# Patient Record
Sex: MALE | Race: WHITE
[De-identification: names, ages, dates, MRNs, and addresses within clinical notes are randomized per-mention and may not be internally consistent; named-entity substitution may affect disease eponyms.]

---

## 2017-09-29 ENCOUNTER — HOSPITAL ENCOUNTER (INPATIENT)
Dept: HOSPITAL 80 - CED | Age: 42
LOS: 2 days | Discharge: HOME | DRG: 563 | End: 2017-10-01
Attending: SURGERY | Admitting: SURGERY
Payer: MEDICAID

## 2017-09-29 DIAGNOSIS — Z23: ICD-10-CM

## 2017-09-29 DIAGNOSIS — S42.215A: ICD-10-CM

## 2017-09-29 DIAGNOSIS — S43.005A: ICD-10-CM
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
## 2017-09-29 DIAGNOSIS — R40.2412: ICD-10-CM

## 2017-09-29 DIAGNOSIS — S52.501A: Primary | ICD-10-CM

## 2017-09-29 DIAGNOSIS — S01.81XA: ICD-10-CM

## 2017-09-29 DIAGNOSIS — S62.101A: ICD-10-CM

## 2017-09-29 DIAGNOSIS — S52.611A: ICD-10-CM

## 2017-09-29 LAB
% IMMATURE GRANULYOCYTES: 0.6 % (ref 0–1.1)
% IMMATURE GRANULYOCYTES: 0.7 % (ref 0–1.1)
ABSOLUTE IMMATURE GRANULOCYTES: 0.05 10^3/UL (ref 0–0.1)
ABSOLUTE IMMATURE GRANULOCYTES: 0.1 10^3/UL (ref 0–0.1)
ABSOLUTE NRBC COUNT: 0 10^3/UL (ref 0–0.01)
ABSOLUTE NRBC COUNT: 0 10^3/UL (ref 0–0.01)
ADD DIFF?: NO
ADD DIFF?: NO
ADD MORPH?: NO
ADD MORPH?: NO
ADD SCAN?: NO
ADD SCAN?: NO
ANION GAP SERPL CALC-SCNC: 13 MEQ/L (ref 8–16)
APTT BLD: 24.3 SEC (ref 23–38)
ATYPICAL LYMPHOCYTE FLAG: 0 (ref 0–99)
ATYPICAL LYMPHOCYTE FLAG: 10 (ref 0–99)
CALCIUM SERPL-MCNC: 9.1 MG/DL (ref 8.5–10.4)
CHLORIDE SERPL-SCNC: 104 MEQ/L (ref 97–110)
CO2 SERPL-SCNC: 24 MEQ/L (ref 22–31)
CREAT SERPL-MCNC: 1.2 MG/DL (ref 0.7–1.3)
ERYTHROCYTE [DISTWIDTH] IN BLOOD BY AUTOMATED COUNT: 12.1 % (ref 11.5–15.2)
ERYTHROCYTE [DISTWIDTH] IN BLOOD BY AUTOMATED COUNT: 12.2 % (ref 11.5–15.2)
FRAGMENT RBC FLAG: 0 (ref 0–99)
FRAGMENT RBC FLAG: 0 (ref 0–99)
GFR SERPL CREATININE-BSD FRML MDRD: > 60 ML/MIN/{1.73_M2}
GLUCOSE SERPL-MCNC: 139 MG/DL (ref 70–100)
HCT VFR BLD CALC: 35.8 % (ref 40–51)
HCT VFR BLD CALC: 41.4 % (ref 40–51)
HGB BLD-MCNC: 12 G/DL (ref 13.7–17.5)
HGB BLD-MCNC: 14.4 G/DL (ref 13.7–17.5)
INR PPP: 1.01 (ref 0.83–1.16)
LEFT SHIFT FLG: 0 (ref 0–99)
LEFT SHIFT FLG: 0 (ref 0–99)
LIPEMIA HEMOLYSIS FLAG: 80 (ref 0–99)
LIPEMIA HEMOLYSIS FLAG: 90 (ref 0–99)
MCH RBC BLDCO QN: 31 PG (ref 27.9–34.1)
MCH RBC BLDCO QN: 31.5 PG (ref 27.9–34.1)
MCHC RBC AUTO-ENTMCNC: 33.5 G/DL (ref 32.4–36.7)
MCHC RBC AUTO-ENTMCNC: 34.8 G/DL (ref 32.4–36.7)
MCV RBC AUTO: 90.6 FL (ref 81.5–99.8)
MCV RBC AUTO: 92.5 FL (ref 81.5–99.8)
NRBC-AUTO%: 0 % (ref 0–0.2)
NRBC-AUTO%: 0 % (ref 0–0.2)
PLATELET # BLD: 174 10^3/UL (ref 150–400)
PLATELET # BLD: 214 10^3/UL (ref 150–400)
PLATELET CLUMPS FLAG: 0 (ref 0–99)
PLATELET CLUMPS FLAG: 10 (ref 0–99)
PMV BLD AUTO: 10.1 FL (ref 8.7–11.7)
PMV BLD AUTO: 9.8 FL (ref 8.7–11.7)
POTASSIUM SERPL-SCNC: 3.8 MEQ/L (ref 3.5–5.2)
PROTHROMBIN TIME: 13 SEC (ref 12–15)
RBC # BLD AUTO: 3.87 10^6/UL (ref 4.4–6.38)
RBC # BLD AUTO: 4.57 10^6/UL (ref 4.4–6.38)
SODIUM SERPL-SCNC: 141 MEQ/L (ref 134–144)

## 2017-09-29 PROCEDURE — G0378 HOSPITAL OBSERVATION PER HR: HCPCS

## 2017-09-29 PROCEDURE — L0174 CERV SR 2PC THOR EXT PRE OTS: HCPCS

## 2017-09-29 PROCEDURE — G0008 ADMIN INFLUENZA VIRUS VAC: HCPCS

## 2017-09-29 PROCEDURE — A4565 SLINGS: HCPCS

## 2017-09-29 NOTE — EDPHY
H & P


HPI/ROS: 





CHIEF COMPLAINT:  Multi trauma





HISTORY OF PRESENT ILLNESS:  This is a 42-year-old male who was initially 

evaluated the [facility] after she walked in following a 

significant traumatic event.  Patient fell 10-15 feet off of a roof landing on 

his head.  Evaluation at the [facility] included CT scans of his 

head, cervical spine, chest, abdomen pelvis all of which demonstrated no acute 

traumatic findings.  Patient had a left shoulder dislocation which was reduced 

with sedation.  Patient also had a right wrist fracture which was reduced and 

splinted, and done under sedation.  Patient has significant scalp laceration on 

the left with a large hematoma.  This was repaired prior to transfer.


Patient's course has been discussed by Dr. Nabil Bedoya with Dr. Long Jade.


Patient presents to the emergency department for further evaluation and 

admission to the hospital.


On arrival to the emergency department the patient reports that he is feeling 

much improved.  He does still continue to have some discomfort in his right 

wrist.





REVIEW OF SYSTEMS:


Aside from elements discussed in the HPI, a comprehensive 10-point review of 

systems was reviewed and is negative.





PAST MEDICAL HISTORY:  Patient denies.  Status post appendectomy





SOCIAL HISTORY:  Nonsmoker.  Works as a .





VITAL SIGNS:  see nurse's notes.


GENERAL:  Well-developed, well-nourished, reporting pain in his wrist.


HEENT:  Bandages around the head.  Pupils are equal round reactive to light.  

Extraocular movements are intact.  No oral trauma noted. Neck:  supple, FROM.  

Non tenderness palpation.


LUNGS:  Clear to auscultation.


CARDIAC:  Regular rate and rhythm.


ABDOMEN:  Soft nontender nondistended.


BACK:  No CVA tenderness.  No vertebral tenderness.


EXTREMITIES:  Left shoulder is in a sling.  Patient has abrasions over the 

deltoid.  Right upper extremity is in a splint and sling.


NEURO:  Alert and oriented, grossly nonfocal.


SKIN:  [Warm and dry, no rash.





Constitutional: 


 Initial Vital Signs











Temperature (C)  36.9 C   09/29/17 16:42


 


Heart Rate  82   09/29/17 16:42


 


Respiratory Rate  28 H  09/29/17 16:42


 


Blood Pressure  140/112 H  09/29/17 16:42


 


O2 Sat (%)  94   09/29/17 16:42








 











O2 Delivery Mode [Post         Room Air





Procedure 4th]                 


 


O2 Delivery Mode [Post         Non-Rebreather Mask





Procedure 3rd]                 


 


O2 Delivery Mode [Post         Non-Rebreather Mask





Procedure 2nd]                 


 


O2 Delivery Mode [Post         Non-Rebreather Mask





Procedure 1st]                 


 


O2 Delivery Mode [Procedural   Non-Rebreather Mask





1st]                           


 


O2 Delivery Mode [Procedural   Non-Rebreather Mask





2nd]                           


 


O2 Delivery Mode [.Immediate   Non-Rebreather Mask





Pre-Procedure Procedural 1st]  


 


O2 Delivery Mode               Room Air


 


O2 (L/minute) [Post Procedure  15





3rd]                           


 


O2 (L/minute) [Post Procedure  15





2nd]                           


 


O2 (L/minute) [Post Procedure  15





1st]                           


 


O2 (L/minute) [Procedural 1st] 15


 


O2 (L/minute) [Procedural 2nd] 15


 


O2 (L/minute) [.Immediate Pre- 15





Procedure Procedural 1st]      


 


O2 (L/minute)                  15














Allergies/Adverse Reactions: 


 





No Known Allergies Allergy (Verified 09/29/17 20:29)


 








Home Medications: 














 Medication  Instructions  Recorded


 


Ibuprofen [Motrin (*)] 600 mg PO Q6H PRN 09/29/17


 


Multivitamins [Multivitamin (*)] 1 each PO DAILY 09/29/17














MDM/Departure





- [facility]


Imaging Results: 


 Imaging Impressions





Abdomen CT  09/29/17 16:46


Impression:


1. 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


2. No evidence of laceration of the liver, spleen, pancreas, or kidneys.


3. No retroperitoneal hematoma.


4. No lumbar compression fractures or pelvic bone fractures.


 


Findings and recommendations discussed with Emergency Department physician, 

Nabil Bedoya MD at 17:35 hour, 9/29/2017.


 


Final report concurs with initial preliminary interpretation.








Cervical Spine CT  09/29/17 16:46


Impression:


1. Posterior dislocation of the left shoulder with Hill-Sachs fracture 

deformity of the left humeral head.


2. No definite cervical spine fracture or spondylolisthesis.


3. If there is persistent pain or neurological deficit, recommend MR cervical 

spine and consider flexion and extension views, if clinically indicated.


 


Findings and recommendations discussed with Emergency Department physician, 

Nabil Bedoya MD, at 17:35 hour, 9/29/2017.


 


Final report concurs with initial preliminary interpretation.








Chest CT  09/29/17 16:46


Impression:


1. Posterior dislocation of the left shoulder with Hill-Sachs fracture 

deformity.


2. No pneumothorax, mediastinal hematoma, or pleural effusion.


3. No aortic rupture or dissection.


 


Findings and recommendations discussed with Emergency Department physician, 

Nabil Bedoya MD, at 17:35 hour, 9/29/2017.


 


Final report concurs with initial preliminary interpretation.








Chest X-Ray  09/29/17 16:46


Impression:


1. No acute pulmonary disease.


2. No pneumothorax.


3. Left humeral head Hill-Sachs fracture deformity better identified on the CT. 

Consider dedicated left shoulder x-rays.








Head CT  09/29/17 16:46


Impression:


1. No intracranial hemorrhage or mass effect.


2. Left frontal scalp hematoma.


3. No skull fracture.


 


Findings and recommendations discussed with Emergency Department physician, 

Nabil Bedoya MD, at 17:35 hour, 9/29/2017.


 


Final report concurs with initial preliminary interpretation.








Knee X-Ray  09/29/17 16:47


Impression:


1. Normal bilateral knee series. No fracture.








Shoulder X-Ray  09/29/17 16:47


Impression:


1. Hairline fracture suspected proximal left humeral shaft that probably 

extends into the humeral head region.


2. Clinical correlation recommended to rule out possible posterior dislocation. 

Consider Y view or axillary view as clinically directed.








Wrist X-Ray  09/29/17 16:47


Impression:  


1. Acute comminuted displaced and angulated intra-articular distal radius 

fracture.


2. Acute displaced ulna styloid fracture.








Knee X-Ray  09/29/17 16:54


Impression:


1. Normal bilateral knee series. No fracture.








Shoulder X-Ray  09/29/17 18:21


Impression: 


1. Good position left humeral head postreduction.


2. A hairline fracture previously suspected proximal left humeral head to neck 

is not as well delineated on these images. Consider follow-up.








Wrist X-Ray  09/29/17 18:22


Impression: Good alignment of complex distal right radial fracture as well as 

fracture of the ulnar styloid.











Medications Given: 


 








Discontinued Medications





Diphtheria/Tetanus/Acell Pertussis (Boostrix)  0.5 ml IM .ONCE ONE


   Stop: 09/29/17 16:49


   Last Admin: 09/29/17 17:57 Dose:  0.5 ml


Fentanyl (Sublimaze)  50 mcg IVP EDNOW ONE


   Stop: 09/29/17 16:49


   Last Admin: 09/29/17 17:02 Dose:  50 mcg


Fentanyl (Sublimaze)  50 mcg IVP EDNOW ONE


   Stop: 09/29/17 17:52


   Last Admin: 09/29/17 17:53 Dose:  50 mcg


Fentanyl (Sublimaze)  50 mcg IVP EDNOW ONE


   Stop: 09/29/17 19:04


   Last Admin: 09/29/17 19:05 Dose:  50 mcg


Sodium Chloride (Ns)  1,000 mls @ 0 mls/hr IV ONCE ONE


   PRN Reason: Wide Open


   Stop: 09/29/17 16:49


   Last Admin: 09/29/17 16:50 Dose:  1,000 mls


Cefazolin Sodium 2 gm/ Sodium (Chloride)  100 mls @ 200 mls/hr IV EDNOW ONE


   PRN Reason: Protocol


   Stop: 09/29/17 19:32


   Last Admin: 09/29/17 19:15 Dose:  Not Given


Sodium Chloride (Ns)  1,000 mls @ 0 mls/hr IV ONCE ONE


   PRN Reason: Wide Open


   Stop: 09/29/17 17:56


   Last Admin: 09/29/17 17:55 Dose:  1,000 mls


Cefazolin Sodium/Dextrose (Ancef 1 Gm (Premix))  50 mls @ 200 mls/hr IV EDNOW 

ONE


   PRN Reason: Protocol


   Stop: 09/29/17 20:07


   Last Admin: 09/29/17 20:08 Dose:  50 mls


Ondansetron HCl (Zofran)  4 mg IVP EDNOW ONE


   Stop: 09/29/17 16:49


   Last Admin: 09/29/17 17:04 Dose:  4 mg


Propofol (Diprivan)  100 mg IVP EDNOW ONE


   Stop: 09/29/17 18:18


   Last Admin: 09/29/17 18:17 Dose:  100 mg





ED Course/Re-evaluation: 





Dr. Jade was contacted on the patient's arrival to the emergency 

department.  Patient has had Ancef ordered but had not been given prior to his 

transfer.  1 g of Ancef was infused.  Patient will be admitted to the surgical 

service for multi trauma.  Dr. Wilson has previously been contact with 

respect to the patient's orthopedic injuries.


Differential Diagnosis: 





Differential diagnosis of this patient's traumatic event was considered 

including but not limited to intracranial injury, long bone and pelvic bone 

fracture, spinal injury, intrathoracic injury, extremity injury, intra-

abdominal injury, lacerations, abrasions, and contusions.





- Depart


Disposition: [facility] Acute


Clinical Impression: 


 Soft tissue injury





Traumatic hematoma of forehead


Qualifiers:


 Encounter type: initial encounter Qualified Code(s): S00.83XA - Contusion of 

other part of head, initial encounter





Forehead laceration


Qualifiers:


 Encounter type: initial encounter Qualified Code(s): S01.81XA - Laceration 

without foreign body of other part of head, initial encounter





Wrist fracture


Qualifiers:


 Encounter type: initial encounter Fracture type: closed Laterality: right 

Qualified Code(s): S62.101A - Fracture of unspecified carpal bone, right wrist, 

initial encounter for closed fracture





Shoulder dislocation


Qualifiers:


 Encounter type: initial encounter Laterality: left Qualified Code(s): S43.005A 

- Unspecified dislocation of left shoulder joint, initial encounter





Humeral surgical neck fracture


Qualifiers:


 Encounter type: initial encounter Fracture type: closed Fracture morphology: 

unspecified fracture morphology Fracture alignment: nondisplaced Laterality: 

left Qualified Code(s): S42.215A - Unspecified nondisplaced fracture of 

surgical neck of left humerus, initial encounter for closed fracture





Fall


Qualifiers:


 Encounter type: initial encounter Qualified Code(s): W19.XXXA - Unspecified 

fall, initial encounter





Condition: Serious

## 2017-09-29 NOTE — EDPHY
H & P


HPI/ROS: 





HPI





CHIEF COMPLAINT:  Fell 10-12 feet off a roof.  Head injury.  Head laceration.  

Right wrist pain.  Left shoulder pain.  Bilateral knee pain.  Positive LOC.





HISTORY OF PRESENT ILLNESS:  This patient is a 42-year-old male, otherwise 

healthy no significant medical history does not take any daily medications not 

on any anticoagulation.  He states that he fell off a roof 10-12 feet.  He 

struck his head on concrete.  Positive LOC.  He presents by private vehicle 

walk into the emergency room.  He is GCS 15 upon arrival.  Hemodynamically 

stable.  His main complaint is left shoulder pain, right wrist pain, headache.  

He denies any neck pain chest pain or shortness of breath.  Denies abdominal 

pain.  Additionally tells me he has bilateral knee pain.  His pain is 7/10.  

Worse on his left shoulder.


He has an obvious deformity on his right wrist.





Upon arrival to the emergency room this patient head to toe trauma exam.  He 

was immediately placed in a cervical collar.  His clothes were all removed.  

His head to toe trauma exam shows an obvious right wrist deformity.  Not open.  

His right hand is neurovascularly intact.  Additionally he has a deformity to 

his left shoulder.  His left arm is neurovascular intact good distal pulse.  

Good distal pulses both arms.  He has obvious head laceration to left frontal 

temporal region.  No midline cervical spine pain or step-offs.  No back pain on 

exam.  Moves all his extremities but complains of bilateral knee pain.





 


Past Medical History:  No medical history





Past Surgical History:  No surgical history





Social History:  Denies daily use drugs alcohol tobacco products.  Works as a 

.  He was painting a wall when he fell.





Family History:  Noncontributory.








ROS   


REVIEW OF SYSTEMS:


A comprehensive 10 point review of systems is otherwise negative aside from 

elements mentioned in the history of present illness.








Exam   


Constitutional GCS 15, alert and orient x4,   triage nursing summary reviewed, 

vital signs reviewed, awake/alert. 


Eyes   normal conjunctivae and sclera, EOMI, PERRLA. 


HENT head/neck:  Obvious scalp hematoma to left side of the head, with 

laceration present, neck no midline cervical spine pain or step-offs, no 

crepitus.  Midface stable.  No evidence of basilar skull fracture.  TMs are 

clear.  moist mucus membranes, no epistaxis, neck supple/ no meningismus, no 

raccoon eyes. 


Respiratory   clear to auscultation bilaterally, normal breath sounds, no 

respiratory distress, no wheezing. 


Cardiovascular no chest wall crepitus.  No chest wall deformity.  rate normal, 

regular rhythm, no murmur, no edema, distal pulses normal. 


Gastrointestinal   soft, non-tender, no rebound, no guarding, normal bowel 

sounds, no distension, no pulsatile mass. 


Genitourinary   no CVA tenderness. 


Musculoskeletal left upper extremity:  Good distal pulse.  Good radial pulse.  

Good cap refill.  Obvious deformity and swelling to left shoulder.  

Additionally right arm good distal pulse good cap refill.  Warm extremity 

obvious deformity to the right wrist.  Closed.  Bilateral knee tenderness to 

palpation anteriorly.  However full range of motion no significant swelling.  

No laceration present.  no midline vertebral tenderness, full range of motion, 

no calf swelling, no tenderness of extremities, no meningismus, neurovascularly 

intact.


Skin   pink, warm, & dry, no rash, laceration present to the left frontal 

temporal region.


Neurologic   awake, alert and oriented x 3, AAOx3, moves all 4 extremities 

equally, motor intact, sensory intact, CN II-XII intact, normal cerebellar, 

normal vision, normal speech. 


Psychiatric   normal mood/affect. 


Heme/Lymph/Immune   no lymphadenopathy.





Differential Diagnosis:  Includes but is not limited to in a particular order 

closed-head injury, concussion, scalp hematoma, scalp laceration, intracranial 

bleed, traumatic subarachnoid, subdural, epidural, skull fracture, cervical 

spine injury, multiple orthopedic injuries including right wrist fracture, left 

shoulder fracture, rib fractures, chest wall injury, intrathoracic chest 

traumatic injury, intra-abdominal traumatic injury, solid organ injury, 

extremity injury.





Medical Decision Making:  Plan for this patient 2 large-bore IVs full cardiac 

monitor, blood draw, IV fluid bolus 1 L normal saline, IV fentanyl for pain 

control.  IV Zofran for nausea.  Proceed directly to CT scan head neck chest 

abdomen pelvis trauma protocol.  And then secondary survey with x-rays of the 

right wrist, left shoulder, bilateral knees.





Re-evaluation:








CT scan of the head without contrast and cervical spine without contrast for 

trauma.  The results of the study are shows a significant left scalp hematoma, 

otherwise no intracranial bleed or skull fracture, no cervical spine fracture].

  The study was read by Dr. Maloney  I viewed the images myself on the PACS 

system.


CT scan of the chest abdomen pelvis with IV contrast for trauma .  The results 

of the study are no acute traumatic injury.  It does recognize a left shoulder 

posterior dislocation with Hill-Sachs deformity..  The study was read by Dr. Maloney I viewed the images myself on the PACS system.





ED x-ray:  Chest one view:  This shows no acute cardiopulmonary disease however 

does recognize the left posterior shoulder dislocation.





ED x-ray left shoulder:  This shows a posterior shoulder dislocation with Hill-

Sachs deformity.  As well as a hairline fracture through the neck head region





ED x-ray right wrist:  This shows a comminuted distal right wrist radius 

fracture.





Critical Care:  Total Critical Care Time Spent Managing this Patient: 65 

Minutes.


This time was spent Exclusively with this patient.


This Care was exclusive of procedures.


The Organ System/life at risk was poly trauma


 This Patient was in Critical Condition because multiple orthopedic fractures.  

Additionally large scalp hematoma additionally large scalp laceration.





1840:


Procedure:  Procedural sedation.


Indication:  Closed reduction of left shoulder posterior dislocation 

additionally indication comminuted distal right radius fracture requiring 

reduction.  Additional procedure laceration repair under conscious sedation.


A pre-sedation evaluation was completed on the patient just prior to the 

procedure.  Patient is an appropriate candidate for procedural sedation with 

ASA class 1 E.  The risks of the sedation were discussed including but not 

limited to dysrhythmia, need for airway intervention or general anesthesia, 

disability, death; and verbal consent obtained.  A timeout was observed and 

patient's identity confirmed.  The patient was sedated with 100mg PROPOFOL The 

patient was monitored with continuous pulse oximetry, capnography, and EKG 

monitor.  There were no complications and no significant hypoxemia.  I remained 

at the bedside for the sedation.  The total time I spent in the procedural 

sedation was 35.





        





1855:  This patient is now recovered from conscious sedation.  He speaking 

coherently.  There were no complications.  His right arm was splinted.  It was 

splinted in a sugar-tong splint.  He is neurovascularly intact post splint 

placement.  Good radial pulse.  Good cap refill.





Additionally the patient's left arm was placed in a sling.  Right arm post sugar

-tong splint is now pacing the sling.





Laceration Repair Procedure: Verbal Consent was obtained, Under sterile 

conditions,  The patient had lidocaine with epinephrine used approximately 

10ccs to local anesthetize the 10cm LEFT SCALP FOREHEAD WITH HEMATOMA 

Laceration.   The wound was copiously irrigated with sterile fluid, the wound 

was explored for foreign bodies there were none visualized, the wound was 

explored with a sterile glove to the base.  There are no deep structures 

involved, including no arterial injury.   NINE Interrupted 5.OPROLENE Sutures 

were placed in this patient's laceration.  He had good close approximation of 

the wound edges.  He Tolerated this well. 








1850:  I spoke with Trauma surgery Dr. Long Osman.  He has accepted this 

patient in transfer to [facility] emergency room as a trauma patient.  Reason 

for transfer observation.  Reason for transfer right arm fracture, left arm 

fracture, left arm dislocation, left forehead hematoma, left forehead laceration

, fall 10 feet.  Need for observation.





ED x-ray bilateral knees:  Images reviewed by me.  I do not appreciate acute 

fracture.














FINAL DIAGNOSIS:  Right wrist fracture, left humeral neck fracture, left 

shoulder dislocation, significant left-sided scalp hematoma, forehead 

laceration.





1859:  Additionally did update the emergency room on this patient's condition.  

He remains hemodynamically stable GCS 15 no acute distress.  He will be 

transferred by ALS transport to ER.











1903:  I did consult Dr. Wilson with Orthopedics and discussed this patient's 

Orthopedics injury including wrist.  Left shoulder.  He will consult on the 

patient at [facility] [place].


Source: Patient


Constitutional: 


 Initial Vital Signs











Temperature (C)  36.9 C   09/29/17 16:42


 


Heart Rate  82   09/29/17 16:42


 


Respiratory Rate  28 H  09/29/17 16:42


 


Blood Pressure  140/112 H  09/29/17 16:42


 


O2 Sat (%)  94   09/29/17 16:42








 











O2 Delivery Mode [Procedural   Non-Rebreather Mask





1st]                           


 


O2 Delivery Mode [Procedural   Non-Rebreather Mask





2nd]                           


 


O2 Delivery Mode [.Immediate   Non-Rebreather Mask





Pre-Procedure Procedural 1st]  


 


O2 Delivery Mode               Room Air


 


O2 (L/minute) [Procedural 1st] 15


 


O2 (L/minute) [Procedural 2nd] 15


 


O2 (L/minute) [.Immediate Pre- 15





Procedure Procedural 1st]      














Allergies/Adverse Reactions: 


 





No Known Allergies Allergy (Verified 09/29/17 16:50)


 








Home Medications: 














 Medication  Instructions  Recorded


 


NK [No Known Home Meds]  09/29/17














Medical Decision Making





- Diagnostics


Imaging Results: 


 Imaging Impressions





Abdomen CT  09/29/17 16:46


Impression:


1. 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


2. No evidence of laceration of the liver, spleen, pancreas, or kidneys.


3. No retroperitoneal hematoma.


4. No lumbar compression fractures or pelvic bone fractures.


 


Findings and recommendations discussed with Emergency Department physician, 

Nabil Bedoya MD at 17:35 hour, 9/29/2017.


 


Final report concurs with initial preliminary interpretation.








Cervical Spine CT  09/29/17 16:46


Impression:


1. Posterior dislocation of the left shoulder with Hill-Sachs fracture 

deformity of the left humeral head.


2. No definite cervical spine fracture or spondylolisthesis.


3. If there is persistent pain or neurological deficit, recommend MR cervical 

spine and consider flexion and extension views, if clinically indicated.


 


Findings and recommendations discussed with Emergency Department physician, 

Nabil Bedoya MD, at 17:35 hour, 9/29/2017.


 


Final report concurs with initial preliminary interpretation.








Chest CT  09/29/17 16:46


Impression:


1. Posterior dislocation of the left shoulder with Hill-Sachs fracture 

deformity.


2. No pneumothorax, mediastinal hematoma, or pleural effusion.


3. No aortic rupture or dissection.


 


Findings and recommendations discussed with Emergency Department physician, 

Nabil Bedoya MD, at 17:35 hour, 9/29/2017.


 


Final report concurs with initial preliminary interpretation.








Chest X-Ray  09/29/17 16:46


Impression:


1. No acute pulmonary disease.


2. No pneumothorax.


3. Left humeral head Hill-Sachs fracture deformity better identified on the CT. 

Consider dedicated left shoulder x-rays.








Head CT  09/29/17 16:46


Impression:


1. No intracranial hemorrhage or mass effect.


2. Left frontal scalp hematoma.


3. No skull fracture.


 


Findings and recommendations discussed with Emergency Department physician, 

Nabil Bedoya MD, at 17:35 hour, 9/29/2017.


 


Final report concurs with initial preliminary interpretation.








Knee X-Ray  09/29/17 16:47


Impression:


1. Normal bilateral knee series. No fracture.








Shoulder X-Ray  09/29/17 16:47


Impression:


1. Hairline fracture suspected proximal left humeral shaft that probably 

extends into the humeral head region.


2. Clinical correlation recommended to rule out possible posterior dislocation. 

Consider Y view or axillary view as clinically directed.








Wrist X-Ray  09/29/17 16:47


Impression:  


1. Acute comminuted displaced and angulated intra-articular distal radius 

fracture.


2. Acute displaced ulna styloid fracture.








Knee X-Ray  09/29/17 16:54


Impression:


1. Normal bilateral knee series. No fracture.














- Data Points


Laboratory Results: 


 Laboratory Results





 09/29/17 19:05 





 09/29/17 16:53 





 











  09/29/17 09/29/17 09/29/17





  19:05 19:05 16:53


 


WBC    14.15 10^3/uL H 10^3/uL  





    (3.80-9.50)  


 


RBC    3.87 10^6/uL L 10^6/uL  





    (4.40-6.38)  


 


Hgb    12.0 g/dL L g/dL  





    (13.7-17.5)  


 


POC Hgb      15.0 gm/dL gm/dL





     (13.7-17.5) 


 


Hct    35.8 % L %  





    (40.0-51.0)  


 


POC Hct      44 % %





     (40-51) 


 


MCV    92.5 fL fL  





    (81.5-99.8)  


 


MCH    31.0 pg pg  





    (27.9-34.1)  


 


MCHC    33.5 g/dL g/dL  





    (32.4-36.7)  


 


RDW    12.2 % %  





    (11.5-15.2)  


 


Plt Count    174 10^3/uL 10^3/uL  





    (150-400)  


 


MPV    9.8 fL fL  





    (8.7-11.7)  


 


Neut % (Auto)    85.7 % H %  





    (39.3-74.2)  


 


Lymph % (Auto)    8.0 % L %  





    (15.0-45.0)  


 


Mono % (Auto)    5.3 % %  





    (4.5-13.0)  


 


Eos % (Auto)    0.1 % L %  





    (0.6-7.6)  


 


Baso % (Auto)    0.2 % L %  





    (0.3-1.7)  


 


Nucleat RBC Rel Count    0.0 % %  





    (0.0-0.2)  


 


Absolute Neuts (auto)    12.13 10^3/uL H 10^3/uL  





    (1.70-6.50)  


 


Absolute Lymphs (auto)    1.13 10^3/uL 10^3/uL  





    (1.00-3.00)  


 


Absolute Monos (auto)    0.75 10^3/uL 10^3/uL  





    (0.30-0.80)  


 


Absolute Eos (auto)    0.01 10^3/uL L 10^3/uL  





    (0.03-0.40)  


 


Absolute Basos (auto)    0.03 10^3/uL 10^3/uL  





    (0.02-0.10)  


 


Absolute Nucleated RBC    0.00 10^3/uL 10^3/uL  





    (0-0.01)  


 


Immature Gran %    0.7 % %  





    (0.0-1.1)  


 


Immature Gran #    0.10 10^3/uL 10^3/uL  





    (0.00-0.10)  


 


PT      





    


 


INR      





    


 


APTT      





    


 


POC Sodium      144 mEq/L mEq/L





     (134-144) 


 


Sodium      





    


 


POC Potassium      3.5 mEq/L mEq/L





     (3.3-5.0) 


 


Potassium      





    


 


POC Chloride      104 mEq/L mEq/L





     () 


 


Chloride      





    


 


Carbon Dioxide      





    


 


Anion Gap      





    


 


POC BUN      14 mg/dL mg/dL





     (7-23) 


 


BUN      





    


 


Creatinine      





    


 


POC Creatinine      1.4 mg/dL H mg/dL





     (0.7-1.3) 


 


Estimated GFR      





    


 


Glucose      





    


 


POC Glucose      154 mg/dL H mg/dL





     () 


 


Calcium      





    


 


Patient ABO/Rh  Pending     





    


 


Antibody Screen  Pending     





    














  09/29/17 09/29/17 09/29/17





  16:53 16:53 16:53


 


WBC      8.28 10^3/uL 10^3/uL





     (3.80-9.50) 


 


RBC      4.57 10^6/uL 10^6/uL





     (4.40-6.38) 


 


Hgb      14.4 g/dL g/dL





     (13.7-17.5) 


 


POC Hgb      





    


 


Hct      41.4 % %





     (40.0-51.0) 


 


POC Hct      





    


 


MCV      90.6 fL fL





     (81.5-99.8) 


 


MCH      31.5 pg pg





     (27.9-34.1) 


 


MCHC      34.8 g/dL g/dL





     (32.4-36.7) 


 


RDW      12.1 % %





     (11.5-15.2) 


 


Plt Count      214 10^3/uL 10^3/uL





     (150-400) 


 


MPV      10.1 fL fL





     (8.7-11.7) 


 


Neut % (Auto)      54.1 % %





     (39.3-74.2) 


 


Lymph % (Auto)      39.9 % %





     (15.0-45.0) 


 


Mono % (Auto)      4.2 % L %





     (4.5-13.0) 


 


Eos % (Auto)      0.6 % %





     (0.6-7.6) 


 


Baso % (Auto)      0.6 % %





     (0.3-1.7) 


 


Nucleat RBC Rel Count      0.0 % %





     (0.0-0.2) 


 


Absolute Neuts (auto)      4.48 10^3/uL 10^3/uL





     (1.70-6.50) 


 


Absolute Lymphs (auto)      3.30 10^3/uL H 10^3/uL





     (1.00-3.00) 


 


Absolute Monos (auto)      0.35 10^3/uL 10^3/uL





     (0.30-0.80) 


 


Absolute Eos (auto)      0.05 10^3/uL 10^3/uL





     (0.03-0.40) 


 


Absolute Basos (auto)      0.05 10^3/uL 10^3/uL





     (0.02-0.10) 


 


Absolute Nucleated RBC      0.00 10^3/uL 10^3/uL





     (0-0.01) 


 


Immature Gran %      0.6 % %





     (0.0-1.1) 


 


Immature Gran #      0.05 10^3/uL 10^3/uL





     (0.00-0.10) 


 


PT    13.0 SEC SEC  





    (12.0-15.0)  


 


INR    1.01   





    (0.83-1.16)  


 


APTT    24.3 SEC SEC  





    (23.0-38.0)  


 


POC Sodium      





    


 


Sodium  141 mEq/L mEq/L    





   (134-144)   


 


POC Potassium      





    


 


Potassium  3.8 mEq/L mEq/L    





   (3.5-5.2)   


 


POC Chloride      





    


 


Chloride  104 mEq/L mEq/L    





   ()   


 


Carbon Dioxide  24 mEq/l mEq/l    





   (22-31)   


 


Anion Gap  13 mEq/L mEq/L    





   (8-16)   


 


POC BUN      





    


 


BUN  14 mg/dL mg/dL    





   (7-23)   


 


Creatinine  1.2 mg/dL mg/dL    





   (0.7-1.3)   


 


POC Creatinine      





    


 


Estimated GFR  > 60     





    


 


Glucose  139 mg/dL H mg/dL    





   ()   


 


POC Glucose      





    


 


Calcium  9.1 mg/dL mg/dL    





   (8.5-10.4)   


 


Patient ABO/Rh      





    


 


Antibody Screen      





    











Medications Given: 


 





Cefazolin Sodium 2 gm/ Sodium (Chloride)  100 mls @ 200 mls/hr IV EDNOW ONE


   PRN Reason: Protocol


   Stop: 09/29/17 19:32


   Last Admin: 09/29/17 19:15 Dose:  Not Given





Discontinued Medications





Diphtheria/Tetanus/Acell Pertussis (Boostrix)  0.5 ml IM .ONCE ONE


   Stop: 09/29/17 16:49


   Last Admin: 09/29/17 17:57 Dose:  0.5 ml


Fentanyl (Sublimaze)  50 mcg IVP EDNOW ONE


   Stop: 09/29/17 16:49


   Last Admin: 09/29/17 17:02 Dose:  50 mcg


Fentanyl (Sublimaze)  50 mcg IVP EDNOW ONE


   Stop: 09/29/17 17:52


   Last Admin: 09/29/17 17:53 Dose:  50 mcg


Fentanyl (Sublimaze)  50 mcg IVP EDNOW ONE


   Stop: 09/29/17 19:04


   Last Admin: 09/29/17 19:05 Dose:  50 mcg


Sodium Chloride (Ns)  1,000 mls @ 0 mls/hr IV ONCE ONE


   PRN Reason: Wide Open


   Stop: 09/29/17 16:49


   Last Admin: 09/29/17 16:50 Dose:  1,000 mls


Ondansetron HCl (Zofran)  4 mg IVP EDNOW ONE


   Stop: 09/29/17 16:49


   Last Admin: 09/29/17 17:04 Dose:  4 mg


Propofol (Diprivan)  100 mg IVP EDNOW ONE


   Stop: 09/29/17 18:18


   Last Admin: 09/29/17 18:17 Dose:  100 mg





Point of Care Test Results: 


 











  09/29/17





  16:53


 


POC Sodium  144


 


POC Potassium  3.5


 


POC Chloride  104


 


POC BUN  14


 


POC Creatinine  1.4 H


 


POC Glucose  154 H














Departure





- Departure


Disposition: [facility] Inpatient Acute


Clinical Impression: 


 Soft tissue injury





Traumatic hematoma of forehead


Qualifiers:


 Encounter type: initial encounter Qualified Code(s): S00.83XA - Contusion of 

other part of head, initial encounter





Forehead laceration


Qualifiers:


 Encounter type: initial encounter Qualified Code(s): S01.81XA - Laceration 

without foreign body of other part of head, initial encounter





Wrist fracture


Qualifiers:


 Encounter type: initial encounter Fracture type: closed Laterality: right 

Qualified Code(s): S62.101A - Fracture of unspecified carpal bone, right wrist, 

initial encounter for closed fracture





Shoulder dislocation


Qualifiers:


 Encounter type: initial encounter Laterality: left Qualified Code(s): S43.005A 

- Unspecified dislocation of left shoulder joint, initial encounter





Humeral surgical neck fracture


Qualifiers:


 Encounter type: initial encounter Fracture type: closed Fracture morphology: 

unspecified fracture morphology Fracture alignment: nondisplaced Laterality: 

left Qualified Code(s): S42.215A - Unspecified nondisplaced fracture of 

surgical neck of left humerus, initial encounter for closed fracture





Fall


Qualifiers:


 Encounter type: initial encounter Qualified Code(s): W19.XXXA - Unspecified 

fall, initial encounter





Condition: Serious


Referrals: 


Patient,NotPresent [Primary Care Provider] - As per Instructions

## 2017-09-29 NOTE — GHP
[f 
rep st]



                                                                 HISTORY AND 
PHYSICAL 





CHIEF COMPLAINT:  Left shoulder, right wrist pain.



HISTORY OF PRESENT ILLNESS:  A 42-year-old male was painting on a roof 10 feet 
off the ground when he fell.  Does not recall the exact accident and was 
believed to have a short loss of consciousness.  He was taken by private car to 
[facility] where a variety of diagnostic tests and examinations 
were done revealing a left scalp laceration with free bleeding and surrounding 
hematoma as well as deformity of the left shoulder, which proved to be a left 
shoulder dislocation.  This was reduced at that site.  Also a right distal 
radius and ulnar fracture are noted.  The patient underwent CT of head, neck, 
chest, abdomen and pelvis showing no other specific injuries.  He was 
transported to [facility] by ambulance with a splint on the 
right forearm and the left shoulder in a sling as well.  He is admitted for 
pain control, orthopedic evaluation and obvious inability to use either arm. 



Laboratory exams showed borderline creatinine 1.2 and a glucose of 139.  



I reviewed the wrist, shoulder, knee x-ray and CT head, chest, abdomen and 
pelvis.  As mentioned, there is a right distal radius ulnar fracture, a left 
Hill-Sachs fracture consistent with a posterior shoulder dislocation.  No 
evidence of abdominal or chest injury. 



Assessed the above-mentioned injuries.  The elevated glucose could be from over-
aggressive fluid challenge versus unsuspected diabetes.  We will get a 
hemoglobin A1c just to answer this question.



ALLERGIES:  None.



CURRENT MEDICATIONS:  None.



PAST SURGICAL HISTORY:  Appendectomy.



REVIEW OF SYSTEMS:  Unremarkable.  Denies asthma, heart trouble, diabetes, 
epilepsy, rheumatic fever.



SOCIAL HISTORY:  Nonsmoker, nondrinker.



PHYSICAL EXAMINATION:  HEENT:  Within normal limits.  Awake, alert.  A head 
wrap and dressing is applied to the left frontal parietal scalp and left in 
place.  Tongue protrudes in the midline.  No facial trauma.  NECK:  Nontender.  
No supraclavicular or axillary crepitus.  LUNGS:  Clear.  HEART:  Normal S1, S2 
without murmur.  Manubrium is stable.  LUNGS:  Clear.  CHEST WALL:  Stable to 
compression.  ABDOMEN:  Soft, benign, nontender.  PELVIS:  Stable, nontender.  
LOWER EXTREMITIES:  Unremarkable.  Good dorsalis pedis pulses bilaterally.



PLAN:  Admit.  Pain control.  Orthopedics to see patient in the morning.





Job #:  [id number]

MTDD

## 2017-09-30 PROCEDURE — 0RSKXZZ REPOSITION LEFT SHOULDER JOINT, EXTERNAL APPROACH: ICD-10-PCS | Performed by: EMERGENCY MEDICINE

## 2017-09-30 NOTE — ASMTCMCOM
CM Note

 

CM Note                       

Notes:

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 Pt has a left shoulder 

dislocation and right wrist fx. Pt lives with wife who will be able to assist pt with his ADLs. C/M 


will follow for any DC needs.

 

Date Signed:  09/30/2017 10:41 AM

Electronically Signed By:Kristy Yepez LCSW

## 2017-09-30 NOTE — TRAUMAPN
Assessment/Plan: 


43yo M s/p fall off roof c R distal wrist fx, L shoulder separation, head lac


FULL TERTIARY EXAM PERFORMED


- no other injuries identified


- orthopedics to see this AM, likely non-op


- head lac open, ice pack, may need additional buttressing


- PT/OT, gonna have issues toileting, ? whether or not needs services





Subjective: 





Doing ok, pain controlled. 


Objective: 





 Vital Signs











Temp Pulse Resp BP Pulse Ox


 


 36.6 C   90   16   95/61 L  99 


 


 09/30/17 07:33  09/30/17 07:33  09/30/17 07:33  09/30/17 07:33  09/30/17 07:33








 











 09/29/17 09/30/17 10/01/17





 05:59 05:59 05:59


 


Intake Total  800 


 


Balance  800 








 











PT  13.0 SEC (12.0-15.0)   09/29/17  16:53    


 


INR  1.01  (0.83-1.16)   09/29/17  16:53

## 2017-09-30 NOTE — GCON
[f rep st]



                                                                    CONSULTATION





ORTHOPEDIC CONSULTATION



DATE OF CONSULTATION:  09/30/2017



REASON FOR CONSULTATION:  

1.  Right intra-articular distal radius fracture. 

2.  Left posterior dislocation.



HPI:  The patient is a 42-year-old  who fell off a roof yesterday afternoon, landing on his he
ad, and sustained a posterior shoulder dislocation on the left and a right wrist fracture in addition
 to a large scalp laceration.  He was initially seen at the [facility].  Shoulder was reduced
, as well as the wrist, and then he was transferred to [facility] for further care.  Ad
mitted to Dr. Jade on the trauma service.  I was consulted to help care for the orthopedic injur
ies.



PRIOR MEDICAL HISTORY:  None.



SURGICAL HISTORY:  Appendectomy.



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  Works full time as a .  ; lives in 
[place].  He does not smoke.  Reports occasional alcohol use.



REVIEW OF SYSTEMS:  No shortness of breath.  No chest pain.  He does complain of some neck stiffness,
 as well as a headache, wrist, shoulder pain.  Otherwise, review of systems is unremarkable.



PHYSICAL EXAM:  VITAL SIGNS:  This morning on the floor, his blood pressure is 95/61, heart rate is 9
0, respiratory rate is 16, oxygen saturation is 99% on room air.  Temperature is 36.6.  He has a dres
sing on his scalp where the laceration was closed.  He has a splint on his right wrist.  He is in a s
ling on his left shoulder. Sensation in the median nerve distribution on the right hand is intact. Br
achial plexus function is intact on the left shoulder. He has 2+ radial pulses bilaterally.



LABORATORY DATA:  Hemoglobin 12, hematocrit 35.  Coag panel is normal.  Chemistries:  Creatinine is s
lightly high at 1.4, blood glucose is high at 154; otherwise chemistry looks good.



IMAGING STUDIES:  X-rays of the wrist pre and post reduction show satisfactory reduction of an intra-
articular distal radius fracture.  He has an ulnar styloid fracture on that side.  Left shoulder has 
been concentrically reduced.  There may be a nondisplaced surgical neck fracture of the humerus on th
e left.



ASSESSMENT:  

1.  Multi trauma with intra-articular distal radius fracture, right, reduced. 

2.  Posterior dislocation, left shoulder, reduced.



PLAN:  His wrist is in reasonable alignment and may be able to treat this nonoperatively if it stays 
aligned.  Will see him back next week in the office and x-ray his wrist, possibly put him in a cast. 
 I did explain if he loses his reduction, he would need open reduction, internal fixation of that.  A
s far as the shoulder is concerned, I think we will at some point need to get an MRI of that to asses
s for a labral injury, as well as evaluate more fully the Hill-Sachs deformity that is present on the
 x-ray.  We will continue the splint on the right arm full-time, sling full-time on the left shoulder
.  He does have his wife at home who can assist him with day-to-day activities.  I did explain, it wi
ll be quite sometime before he will be able to paint again and he understands this, and again followu
p with me will be next week in the office.





Job #:  [id number]

## 2017-10-01 VITALS
TEMPERATURE: 98.2 F | SYSTOLIC BLOOD PRESSURE: 110 MMHG | DIASTOLIC BLOOD PRESSURE: 70 MMHG | HEART RATE: 94 BPM | RESPIRATION RATE: 16 BRPM

## 2017-10-01 VITALS — OXYGEN SATURATION: 94 %

## 2017-10-01 RX ADMIN — IBUPROFEN PRN MG: 600 TABLET ORAL at 15:15

## 2017-10-01 RX ADMIN — OXYCODONE HYDROCHLORIDE PRN MG: 15 TABLET ORAL at 04:41

## 2017-10-01 RX ADMIN — IBUPROFEN PRN MG: 600 TABLET ORAL at 06:59

## 2017-10-01 RX ADMIN — OXYCODONE HYDROCHLORIDE PRN MG: 15 TABLET ORAL at 12:41

## 2017-10-01 NOTE — ASMTCMCOM
CM Note

 

CM Note                       

Notes:

Patient has been discharged home with wife to assist as needed. PT recommends out-pt PT when 

ready, to f/u with out-pt clinic for suture removal.

 

Date Signed:  10/01/2017 04:38 PM

Electronically Signed By:Gay Isidro LCSW

## 2017-10-01 NOTE — TRAUMAPN
- Problem/Surgery Performed


(1) Forehead laceration


Assessment/Plan: 


Suture closure of laceration along with small abrasion.  Patient denies 

headache loss of consciousness or other post concussive symptoms.  He will need 

follow-up in 1 week in the emergency room or outpatient surgery Clinic for 

suture removal


Qualifiers: 


   Encounter type: initial encounter   Qualified Code(s): S01.81XA - Laceration 

without foreign body of other part of head, initial encounter   





(2) Shoulder dislocation


Assessment/Plan: 


Consultation with Dr. Wilson from Orthopedic surgery who recommends full-time 

sling follow-up MRI for possible labral tear.  His wife is aware he will need 

support for activities of daily living.


Qualifiers: 


   Encounter type: initial encounter   Laterality: left   Qualified Code(s): 

S43.005A - Unspecified dislocation of left shoulder joint, initial encounter   





(3) Wrist fracture


Assessment/Plan: 


Intra-articular distal right radius fracture splinted and will require sling 

full time nonweightbearing per Dr. Wilson orthopedic surgery. He will follow 

up with Dr. Wilson in 1 week for clinical and radiologic evaluation of 

whether reduction of the fracture is sufficient for non operative care or 

whether fixation will be required.


Qualifiers: 


   Encounter type: initial encounter   Fracture type: closed   Laterality: 

right   Qualified Code(s): S62.101A - Fracture of unspecified carpal bone, 

right wrist, initial encounter for closed fracture   


Assessment/Plan: 


Overall doing well after fall from 10-12 feet.  The patient is a  and 

will be off work until cleared clinically by Dr. Wilson.  Discharge will be 

based on whether occupational therapy is able to assist him/evaluate him for 

home care with assistance from his wife.  All questions were addressed with the 

patient and his wife.  Most pressing concerns include time off work which will 

be an ongoing evaluation/evolution based on his overall needs from an 

orthopedic standpoint.  They are aware of this.  They are aware of the follow 

up with Dr. Wilson next week.


Regular rate and rhythm


Clear to auscultation


Abdomen soft nontender nondistended


Left laceration repaired lateral abrasion healing


Good muscle strength with  distally neurovascularly intact bilateral upper 

extremities





Discharge pending


Objective: 





 Vital Signs











Temp Pulse Resp BP Pulse Ox


 


 36.8 C   94   16   110/70   94 


 


 10/01/17 08:00  10/01/17 08:00  10/01/17 08:00  10/01/17 08:00  10/01/17 08:00








 











 09/30/17 10/01/17 10/02/17





 05:59 05:59 05:59


 


Intake Total  700 


 


Balance  700 








 











PT  13.0 SEC (12.0-15.0)   09/29/17  16:53    


 


INR  1.01  (0.83-1.16)   09/29/17  16:53

## 2017-10-02 LAB
EST. AVERAGE GLUCOSE BLD GHB EST-MCNC: 103 MG/DL (ref 68–126)
HBA1C MFR BLD: 5.2 % (ref 4–6)

## 2017-10-02 NOTE — ASDISCHSUM
----------------------------------------------

Discharge Information

----------------------------------------------

Plan Status:Home with No Needs                       Medically Cleared to Leave:10/01/2017

Discharge Date:10/01/2017 05:03 PM                   CM D/C Disposition:Home, Routine, Self-Care

ADT D/C Disposition:Home, Routine, Self-Care         Projected Discharge Date:10/01/2017 05:00 PM

Transportation at D/C:Family                         Discharge Delay Reason:

Follow-Up Date:10/01/2017 05:00 PM                   Discharge Slot:

Final Diagnosis:Concussion, dislocation left shoulder, wrist fx

----------------------------------------------

Placement Information

----------------------------------------------

----------------------------------------------

Patient Contact Information

----------------------------------------------

Contact Name:NGHIA                       Relationship:Wife

Address:[address]                             Home [phone]

                                                     Work Phone:

[facility]:[place]                                       Alternate Phone:

[facility]/Zip Code:[place] [address]                              Email:

----------------------------------------------

Financial Information

----------------------------------------------

Financial Class:MD

Primary Plan Desc:[employer] [place]        Primary Plan Number:[id number]

Secondary Plan Desc:                                 Secondary Plan Number:

 

 

----------------------------------------------

Assessment Information

----------------------------------------------

----------------------------------------------

[facility] CM Progress Note

----------------------------------------------

CM Note

 

CM Note                       

Notes:

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 Pt has a left shoulder 

dislocation and right wrist fx. Pt lives with wife who will be able to assist pt with his ADLs. C/M 


will follow for any DC needs.

 

Date Signed:  09/30/2017 10:41 AM

Electronically Signed By:Kristy Yepez LCSW

 

 

----------------------------------------------

[facility] CM Progress Note

----------------------------------------------

CM Note

 

CM Note                       

Notes:

Patient has been discharged home with wife to assist as needed. PT recommends out-pt PT when 

ready, to f/u with out-pt clinic for suture removal.

 

Date Signed:  10/01/2017 04:38 PM

Electronically Signed By:Gay Gilbert, LCSW

 

 

----------------------------------------------

Intervention Information

----------------------------------------------

## 2017-10-12 ENCOUNTER — HOSPITAL ENCOUNTER (OUTPATIENT)
Dept: HOSPITAL 80 - FIMAGING | Age: 42
End: 2017-10-12
Attending: PHYSICIAN ASSISTANT
Payer: MEDICAID

## 2017-10-12 DIAGNOSIS — M75.102: Primary | ICD-10-CM

## 2017-11-18 ENCOUNTER — HOSPITAL ENCOUNTER (OUTPATIENT)
Dept: HOSPITAL 80 - FIMAGING | Age: 42
End: 2017-11-18
Attending: PHYSICIAN ASSISTANT
Payer: MEDICAID

## 2017-11-18 DIAGNOSIS — S63.512A: Primary | ICD-10-CM

## 2018-02-08 ENCOUNTER — HOSPITAL ENCOUNTER (OUTPATIENT)
Dept: HOSPITAL 80 - FIMAGING | Age: 43
End: 2018-02-08
Attending: PHYSICIAN ASSISTANT
Payer: MEDICAID

## 2018-02-08 DIAGNOSIS — S63.501D: Primary | ICD-10-CM
